# Patient Record
Sex: FEMALE | Race: OTHER | NOT HISPANIC OR LATINO | URBAN - METROPOLITAN AREA
[De-identification: names, ages, dates, MRNs, and addresses within clinical notes are randomized per-mention and may not be internally consistent; named-entity substitution may affect disease eponyms.]

---

## 2017-01-28 ENCOUNTER — INPATIENT (INPATIENT)
Facility: HOSPITAL | Age: 62
LOS: 2 days | Discharge: HOME | End: 2017-01-31
Attending: INTERNAL MEDICINE | Admitting: INTERNAL MEDICINE
Payer: MEDICAID

## 2017-01-28 VITALS
TEMPERATURE: 99 F | DIASTOLIC BLOOD PRESSURE: 80 MMHG | HEART RATE: 63 BPM | SYSTOLIC BLOOD PRESSURE: 129 MMHG | RESPIRATION RATE: 20 BRPM | OXYGEN SATURATION: 99 %

## 2017-01-28 DIAGNOSIS — E03.9 HYPOTHYROIDISM, UNSPECIFIED: ICD-10-CM

## 2017-01-28 DIAGNOSIS — R55 SYNCOPE AND COLLAPSE: ICD-10-CM

## 2017-01-28 DIAGNOSIS — I10 ESSENTIAL (PRIMARY) HYPERTENSION: ICD-10-CM

## 2017-01-28 LAB
ALBUMIN SERPL ELPH-MCNC: 3.9 G/DL — SIGNIFICANT CHANGE UP (ref 3.3–5)
ALP SERPL-CCNC: 71 U/L — SIGNIFICANT CHANGE UP (ref 40–120)
ALT FLD-CCNC: 33 U/L — SIGNIFICANT CHANGE UP (ref 4–33)
APTT BLD: 30.8 SEC — SIGNIFICANT CHANGE UP (ref 27.5–37.4)
AST SERPL-CCNC: 72 U/L — HIGH (ref 4–32)
BASE EXCESS BLDV CALC-SCNC: 3.5 MMOL/L — SIGNIFICANT CHANGE UP
BASOPHILS # BLD AUTO: 0.02 K/UL — SIGNIFICANT CHANGE UP (ref 0–0.2)
BASOPHILS NFR BLD AUTO: 0.5 % — SIGNIFICANT CHANGE UP (ref 0–2)
BILIRUB SERPL-MCNC: 0.5 MG/DL — SIGNIFICANT CHANGE UP (ref 0.2–1.2)
BLOOD GAS VENOUS - CREATININE: 1.73 MG/DL — HIGH (ref 0.5–1.3)
BUN SERPL-MCNC: 23 MG/DL — SIGNIFICANT CHANGE UP (ref 7–23)
CALCIUM SERPL-MCNC: 9.2 MG/DL — SIGNIFICANT CHANGE UP (ref 8.4–10.5)
CHLORIDE BLDV-SCNC: 105 MMOL/L — SIGNIFICANT CHANGE UP (ref 96–108)
CHLORIDE SERPL-SCNC: 100 MMOL/L — SIGNIFICANT CHANGE UP (ref 98–107)
CK MB BLD-MCNC: 1 NG/ML — SIGNIFICANT CHANGE UP (ref 1–4.7)
CK MB BLD-MCNC: 1.01 NG/ML — SIGNIFICANT CHANGE UP (ref 1–4.7)
CK MB BLD-MCNC: SIGNIFICANT CHANGE UP (ref 0–2.5)
CK SERPL-CCNC: 47 U/L — SIGNIFICANT CHANGE UP (ref 25–170)
CK SERPL-CCNC: 87 U/L — SIGNIFICANT CHANGE UP (ref 25–170)
CO2 SERPL-SCNC: 24 MMOL/L — SIGNIFICANT CHANGE UP (ref 22–31)
CREAT SERPL-MCNC: 1.59 MG/DL — HIGH (ref 0.5–1.3)
EOSINOPHIL # BLD AUTO: 0.03 K/UL — SIGNIFICANT CHANGE UP (ref 0–0.5)
EOSINOPHIL NFR BLD AUTO: 0.7 % — SIGNIFICANT CHANGE UP (ref 0–6)
GAS PNL BLDV: 140 MMOL/L — SIGNIFICANT CHANGE UP (ref 136–146)
GLUCOSE BLDV-MCNC: 117 — HIGH (ref 70–99)
GLUCOSE SERPL-MCNC: 120 MG/DL — HIGH (ref 70–99)
HCO3 BLDV-SCNC: 24 MMOL/L — SIGNIFICANT CHANGE UP (ref 20–27)
HCT VFR BLD CALC: 43 % — SIGNIFICANT CHANGE UP (ref 34.5–45)
HCT VFR BLDV CALC: 43.2 % — SIGNIFICANT CHANGE UP (ref 34.5–45)
HGB BLD-MCNC: 13.9 G/DL — SIGNIFICANT CHANGE UP (ref 11.5–15.5)
HGB BLDV-MCNC: 14.1 G/DL — SIGNIFICANT CHANGE UP (ref 11.5–15.5)
IMM GRANULOCYTES NFR BLD AUTO: 0.5 % — SIGNIFICANT CHANGE UP (ref 0–1.5)
INR BLD: 1.16 — SIGNIFICANT CHANGE UP (ref 0.87–1.18)
LACTATE BLDV-MCNC: 1.6 MMOL/L — SIGNIFICANT CHANGE UP (ref 0.5–2)
LYMPHOCYTES # BLD AUTO: 1.78 K/UL — SIGNIFICANT CHANGE UP (ref 1–3.3)
LYMPHOCYTES # BLD AUTO: 40.5 % — SIGNIFICANT CHANGE UP (ref 13–44)
MCHC RBC-ENTMCNC: 28.5 PG — SIGNIFICANT CHANGE UP (ref 27–34)
MCHC RBC-ENTMCNC: 32.3 % — SIGNIFICANT CHANGE UP (ref 32–36)
MCV RBC AUTO: 88.3 FL — SIGNIFICANT CHANGE UP (ref 80–100)
MONOCYTES # BLD AUTO: 0.37 K/UL — SIGNIFICANT CHANGE UP (ref 0–0.9)
MONOCYTES NFR BLD AUTO: 8.4 % — SIGNIFICANT CHANGE UP (ref 2–14)
NEUTROPHILS # BLD AUTO: 2.18 K/UL — SIGNIFICANT CHANGE UP (ref 1.8–7.4)
NEUTROPHILS NFR BLD AUTO: 49.4 % — SIGNIFICANT CHANGE UP (ref 43–77)
NT-PROBNP SERPL-SCNC: 121.8 PG/ML — SIGNIFICANT CHANGE UP
PCO2 BLDV: 59 MMHG — HIGH (ref 41–51)
PH BLDV: 7.32 PH — SIGNIFICANT CHANGE UP (ref 7.32–7.43)
PLATELET # BLD AUTO: 152 K/UL — SIGNIFICANT CHANGE UP (ref 150–400)
PMV BLD: 11 FL — SIGNIFICANT CHANGE UP (ref 7–13)
PO2 BLDV: < 24 MMHG — LOW (ref 35–40)
POTASSIUM BLDV-SCNC: 4.8 MMOL/L — HIGH (ref 3.4–4.5)
POTASSIUM SERPL-MCNC: 4.9 MMOL/L — SIGNIFICANT CHANGE UP (ref 3.5–5.3)
POTASSIUM SERPL-SCNC: 4.9 MMOL/L — SIGNIFICANT CHANGE UP (ref 3.5–5.3)
PROT SERPL-MCNC: 8.3 G/DL — SIGNIFICANT CHANGE UP (ref 6–8.3)
PROTHROM AB SERPL-ACNC: 13.2 SEC — HIGH (ref 10–13.1)
RBC # BLD: 4.87 M/UL — SIGNIFICANT CHANGE UP (ref 3.8–5.2)
RBC # FLD: 14.2 % — SIGNIFICANT CHANGE UP (ref 10.3–14.5)
SAO2 % BLDV: 11.9 % — LOW (ref 60–85)
SODIUM SERPL-SCNC: 141 MMOL/L — SIGNIFICANT CHANGE UP (ref 135–145)
TROPONIN T SERPL-MCNC: < 0.06 NG/ML — SIGNIFICANT CHANGE UP (ref 0–0.06)
TROPONIN T SERPL-MCNC: < 0.06 NG/ML — SIGNIFICANT CHANGE UP (ref 0–0.06)
TSH SERPL-MCNC: 0.81 UIU/ML — SIGNIFICANT CHANGE UP (ref 0.27–4.2)
WBC # BLD: 4.4 K/UL — SIGNIFICANT CHANGE UP (ref 3.8–10.5)
WBC # FLD AUTO: 4.4 K/UL — SIGNIFICANT CHANGE UP (ref 3.8–10.5)

## 2017-01-28 PROCEDURE — 71020: CPT | Mod: 26

## 2017-01-28 PROCEDURE — 70450 CT HEAD/BRAIN W/O DYE: CPT | Mod: 26

## 2017-01-28 NOTE — ED ADULT TRIAGE NOTE - CHIEF COMPLAINT QUOTE
From MD office with syncope pt AOX 3 stating being dizzy prior to syncope 2nd episode in Ambulance, denies cp/SOB   Hx. hypothyroid, htn. From MD office with syncope pt AOX 3 stating being dizzy prior to syncope 2nd episode in Ambulance, denies cp/SOB     [id number]    Hx. hypothyroid, htn.

## 2017-01-28 NOTE — H&P ADULT. - PROBLEM SELECTOR PLAN 1
Admit to tele  CBC, BMP, serial CE's, EKG, Echo, CT-A Admit to tele  orthostatics every 24 hours   CBC, BMP, serial CE's, EKG, Echo  CXR, CT head   f/u MD note Vasovagal vs orthostatic in setting of viral syndrome, poor PO intake and dehydration, less likely arrhythmia or cardiogenic  Monitor on telemetry  Check orthostatic VS  CBC, BMP, serial CE's, EKG  CXR, CT head done orthostasis in setting of viral syndrome, poor PO intake and dehydration, less likely arrhythmia or cardiogenic  Monitor on telemetry  Check orthostatic VS  CBC, BMP, serial CE's, EKG  CXR, CT head done

## 2017-01-28 NOTE — H&P ADULT. - NEGATIVE NEUROLOGICAL SYMPTOMS
no transient paralysis/no loss of sensation/no headache/no confusion/no vertigo/no hemiparesis/no paresthesias/no weakness

## 2017-01-28 NOTE — H&P ADULT. - NEGATIVE GASTROINTESTINAL SYMPTOMS
no constipation/no abdominal pain/no nausea/no vomiting/no melena/no hematochezia no constipation/no diarrhea/no abdominal pain/no nausea/no melena/no hematochezia/no change in bowel habits/no vomiting

## 2017-01-28 NOTE — ED ADULT NURSE NOTE - OBJECTIVE STATEMENT
pt to trauma A alert and oriented X 3 with family at bedside, pt comes after syncopal episode in MD's office today, pt has had  multiple episodes of syncope no cardiologist, no forma evaluation AMA from [place] on last admission p/w syncope in her PCP's office today sent by ambulance. Pt suffered from one episode yesterday, one episode today in doctor's office, apparently one episode witnessed by EMS.  No head strike or neck pain +LOC. Multiple episodes today.  No chest pain, shortness of breath, radiation to the arm, jaw, neck or back, worsening symptoms with exertion, headache, nausea, or vomiting.  No history of stress test, angiogram, echocardiogram, or follow up with a cardiologist.  No personal history of coronary artery disease, arrhythmia, or CHF.  No family history of coronary artery disease, sudden cardiac death, or arrhythmia., labs drawn and sent, vitals as noted, waiting for ct and CXR will repeat lab work when necessary

## 2017-01-28 NOTE — ED ADULT NURSE NOTE - CHIEF COMPLAINT QUOTE
From MD office with syncope pt AOX 3 stating being dizzy prior to syncope 2nd episode in Ambulance, denies cp/SOB     [id number]    Hx. hypothyroid, htn.

## 2017-01-28 NOTE — H&P ADULT. - RS GEN PE MLT RESP DETAILS PC
no wheezes/respirations non-labored/good air movement/airway patent/clear to auscultation bilaterally/breath sounds equal/no rhonchi/no rales

## 2017-01-28 NOTE — ED PROVIDER NOTE - ATTENDING CONTRIBUTION TO CARE
MD Mckeon:  patient seen and evaluated with the resident.  I was present for key portions of the History & Physical, and I agree with the Impression & Plan.  MD Mckeon:  62 yo F, c/o 3 episodes syncope in the last 24 hrs.  Once yesterday while making dinner, once in MD office this AM, and then again in ambulance on way to the ED.  No CP/SOB, no head injury/neck pain/weakness.  Patient feels fine now.  No new meds or dose-changes, no dysuria/F/C/back pain.  Vs - wnl.  ECG - unremarkable.  Physical Exam: adult F, NAD, NCAT, PERRL, EOMI, Neck supple, CTA B, RRR, no rmg, Abd s/nd/nd.  Strength 5/5 bilaterally throughout.  Impression:  Recurrent episodes of syncope and collapse that have been insufficiently worked up in the past.  Plan:  Tele, enzymes, UA,

## 2017-01-28 NOTE — ED PROVIDER NOTE - OBJECTIVE STATEMENT
61 YOF PMH HTN, hypothyroidism, multiple episodes of syncope no cardiologist, no forma evaluation AMA from [place] on last admission p/w syncope in her PCP's office today sent by ambulance. Pt suffered from one episode yesterday, one episode today in doctor's office.  No head strike or neck pain +LOC. Multiple episodes today.  No chest pain, shortness of breath, radiation to the arm, jaw, neck or back, worsening symptoms with exertion, headache, nausea, or vomiting.  No history of stress test, angiogram, echocardiogram, or follow up with a cardiologist.  No personal history of coronary artery disease, arrhythmia, or CHF.  No family history of coronary artery disease, sudden cardiac death, or arrhythmia. 61 YOF PMH HTN, hypothyroidism, multiple episodes of syncope no cardiologist, no forma evaluation AMA from [place] on last admission p/w syncope in her PCP's office today sent by ambulance. Pt suffered from one episode yesterday, one episode today in doctor's office, apparently one episode witnessed by EMS.  No head strike or neck pain +LOC. Multiple episodes today.  No chest pain, shortness of breath, radiation to the arm, jaw, neck or back, worsening symptoms with exertion, headache, nausea, or vomiting.  No history of stress test, angiogram, echocardiogram, or follow up with a cardiologist.  No personal history of coronary artery disease, arrhythmia, or CHF.  No family history of coronary artery disease, sudden cardiac death, or arrhythmia.

## 2017-01-28 NOTE — H&P ADULT. - PSYCHIATRIC
negative Affect and characteristics of appearance, verbalizations, behaviors are appropriate detailed exam

## 2017-01-28 NOTE — ED PROVIDER NOTE - MEDICAL DECISION MAKING DETAILS
61 YOF PMH HTN, hypothyroidism, multiple episodes of syncope no cardiologist, no forma evaluation AMA from [place] on last admission p/w syncope in her PCP's office today sent by ambulance.  R/O ACS, evaluate for arrhythmia, admit for echo/stress/syncope evaluation.

## 2017-01-28 NOTE — H&P ADULT. - PROBLEM SELECTOR PLAN 3
Order TSH, T3, T4  Continue Levothyroxine 100mg Monitor blood pressure  Continue Losartan 100mg  DASH diet Monitor blood pressure  Hold losartan pending orthostatic assessment  DASH diet

## 2017-01-28 NOTE — H&P ADULT. - NEGATIVE ALLERGY TYPES
no indoor environmental allergies/no outdoor environmental allergies/no reactions to food/no reactions to medicines

## 2017-01-28 NOTE — H&P ADULT. - HISTORY OF PRESENT ILLNESS
62 yo female with PMHx of HTN, hypothyroidism presents with syncope. She describes feeling lightheaded and diaphoretic before syncopizing. She has had these episodes 7-8 times in the past two weeks. Her  states that she usually loses consciousness for 15-20 seconds and returns to her normal self immediately. Today, she had an episode of diarrhea with her syncope. Of note, she has been sick for the past 2 weeks with fevers, chills, malaise. She denies nausea, vomiting, constipation, abdominal pain, chest pain, palpitations, SOB, cough, numbness, tingling, headache, weakness, hemiparesis, incontinence, dysuria, hematuria. 60 yo female with PMHx of HTN, hypothyroidism presents with syncope. Pt states she was at the doctors office and she felt lightheaded and diaphoretic and syncopized. Pt reports she has had these episodes 7-8 times in the past two weeks. As per , pt usually loses consciousness for 15-20 seconds and returns to her normal self immediately. Pt reports she has been sick for the past 2 weeks with fevers, chills, malaise. Pt states she was given an antibiotic which she can't recall,  however she is still feeling these symptoms.  Pt denies nausea, vomiting, constipation, abdominal pain, chest pain, palpitations, SOB, cough, numbness, tingling, headache, weakness, hemiparesis, incontinence, dysuria, hematuria or any other complaints at this time. 60 yo female with PMHx of HTN, hypothyroidism presents with syncope. Pt states she was at the doctors office and she felt lightheaded and diaphoretic and syncopized. Pt also syncopized once when EMS arrived. Pt reports she has had these episodes 7-8 times in the past two weeks. As per , pt usually loses consciousness for 15-20 seconds and returns to her normal self immediately. Pt reports she has been sick for the past 2 weeks with fevers, chills, malaise. Pt states she was given an antibiotic which she can't recall,  however she is still feeling these symptoms.  Pt denies nausea, vomiting, constipation, abdominal pain, chest pain, palpitations, SOB, cough, numbness, tingling, headache, weakness, hemiparesis, incontinence, dysuria, hematuria or any other complaints at this time. 62 yo female with PMHx of HTN, hypothyroidism presents with syncope. Pt states she was at the doctors office and she felt lightheaded and diaphoretic and syncopized. Pt also syncopized once when EMS arrived. Pt reports she has had these episodes 7-8 times in the past two weeks. As per , pt usually loses consciousness for 15-20 seconds and returns to her normal self immediately. Usually occurs when she is getting up from seated position or standing for long periods of time. Pt reports she has been sick for the past 2 weeks with fevers, chills, malaise. Daughter has the flu. Pt states she was given an antibiotic which she can't recall,  however she is still feeling these symptoms.  Pt denies nausea, vomiting, constipation, abdominal pain, chest pain, palpitations, SOB, cough, numbness, tingling, headache, weakness, hemiparesis, incontinence, dysuria, hematuria or any other complaints at this time.

## 2017-01-28 NOTE — H&P ADULT. - PROBLEM SELECTOR PLAN 2
Monitor vitals  Continue Losartan 100mg Monitor renal function. Avoid nephrotoxic drugs.   Normal saline @ 75 cc an hour. Likely prerenal  Monitor renal function. Avoid nephrotoxic drugs.   Normal saline @ 75 cc an hour

## 2017-01-28 NOTE — H&P ADULT. - ASSESSMENT
60 yo F with PMHx of HTN, hypothyroidism presents with syncope + LOC 60 yo F with PMHx of HTN, hypothyroidism presents with multiple episodes of syncope. Admitted to telemetry for syncope.

## 2017-01-29 DIAGNOSIS — R50.9 FEVER, UNSPECIFIED: ICD-10-CM

## 2017-01-29 DIAGNOSIS — N17.9 ACUTE KIDNEY FAILURE, UNSPECIFIED: ICD-10-CM

## 2017-01-29 DIAGNOSIS — Z41.8 ENCOUNTER FOR OTHER PROCEDURES FOR PURPOSES OTHER THAN REMEDYING HEALTH STATE: ICD-10-CM

## 2017-01-29 LAB
APPEARANCE UR: CLEAR — SIGNIFICANT CHANGE UP
B PERT DNA SPEC QL NAA+PROBE: SIGNIFICANT CHANGE UP
BACTERIA # UR AUTO: SIGNIFICANT CHANGE UP
BILIRUB UR-MCNC: NEGATIVE — SIGNIFICANT CHANGE UP
BLOOD UR QL VISUAL: NEGATIVE — SIGNIFICANT CHANGE UP
BUN SERPL-MCNC: 26 MG/DL — HIGH (ref 7–23)
C PNEUM DNA SPEC QL NAA+PROBE: NOT DETECTED — SIGNIFICANT CHANGE UP
CALCIUM SERPL-MCNC: 7.9 MG/DL — LOW (ref 8.4–10.5)
CHLORIDE SERPL-SCNC: 102 MMOL/L — SIGNIFICANT CHANGE UP (ref 98–107)
CHOLEST SERPL-MCNC: 152 MG/DL — SIGNIFICANT CHANGE UP (ref 120–199)
CO2 SERPL-SCNC: 22 MMOL/L — SIGNIFICANT CHANGE UP (ref 22–31)
COLOR SPEC: SIGNIFICANT CHANGE UP
CREAT SERPL-MCNC: 2.52 MG/DL — HIGH (ref 0.5–1.3)
FLUAV H1 2009 PAND RNA SPEC QL NAA+PROBE: POSITIVE — HIGH
FLUAV H1 RNA SPEC QL NAA+PROBE: NOT DETECTED — SIGNIFICANT CHANGE UP
FLUAV H3 RNA SPEC QL NAA+PROBE: NOT DETECTED — SIGNIFICANT CHANGE UP
FLUBV RNA SPEC QL NAA+PROBE: NOT DETECTED — SIGNIFICANT CHANGE UP
GLUCOSE SERPL-MCNC: 100 MG/DL — HIGH (ref 70–99)
GLUCOSE UR-MCNC: NEGATIVE — SIGNIFICANT CHANGE UP
HADV DNA SPEC QL NAA+PROBE: NOT DETECTED — SIGNIFICANT CHANGE UP
HBA1C BLD-MCNC: 6.1 % — HIGH (ref 4–5.6)
HCOV 229E RNA SPEC QL NAA+PROBE: NOT DETECTED — SIGNIFICANT CHANGE UP
HCOV HKU1 RNA SPEC QL NAA+PROBE: NOT DETECTED — SIGNIFICANT CHANGE UP
HCOV NL63 RNA SPEC QL NAA+PROBE: NOT DETECTED — SIGNIFICANT CHANGE UP
HCOV OC43 RNA SPEC QL NAA+PROBE: NOT DETECTED — SIGNIFICANT CHANGE UP
HCT VFR BLD CALC: 38.2 % — SIGNIFICANT CHANGE UP (ref 34.5–45)
HDLC SERPL-MCNC: 29 MG/DL — LOW (ref 45–65)
HGB BLD-MCNC: 12.6 G/DL — SIGNIFICANT CHANGE UP (ref 11.5–15.5)
HMPV RNA SPEC QL NAA+PROBE: NOT DETECTED — SIGNIFICANT CHANGE UP
HPIV1 RNA SPEC QL NAA+PROBE: NOT DETECTED — SIGNIFICANT CHANGE UP
HPIV2 RNA SPEC QL NAA+PROBE: NOT DETECTED — SIGNIFICANT CHANGE UP
HPIV3 RNA SPEC QL NAA+PROBE: NOT DETECTED — SIGNIFICANT CHANGE UP
HPIV4 RNA SPEC QL NAA+PROBE: NOT DETECTED — SIGNIFICANT CHANGE UP
KETONES UR-MCNC: NEGATIVE — SIGNIFICANT CHANGE UP
LEUKOCYTE ESTERASE UR-ACNC: HIGH
LIPID PNL WITH DIRECT LDL SERPL: 114 MG/DL — SIGNIFICANT CHANGE UP
M PNEUMO DNA SPEC QL NAA+PROBE: NOT DETECTED — SIGNIFICANT CHANGE UP
MAGNESIUM SERPL-MCNC: 2 MG/DL — SIGNIFICANT CHANGE UP (ref 1.6–2.6)
MCHC RBC-ENTMCNC: 28.8 PG — SIGNIFICANT CHANGE UP (ref 27–34)
MCHC RBC-ENTMCNC: 33 % — SIGNIFICANT CHANGE UP (ref 32–36)
MCV RBC AUTO: 87.4 FL — SIGNIFICANT CHANGE UP (ref 80–100)
MUCOUS THREADS # UR AUTO: SIGNIFICANT CHANGE UP
NITRITE UR-MCNC: NEGATIVE — SIGNIFICANT CHANGE UP
PH UR: 6 — SIGNIFICANT CHANGE UP (ref 4.6–8)
PHOSPHATE SERPL-MCNC: 4.5 MG/DL — SIGNIFICANT CHANGE UP (ref 2.5–4.5)
PLATELET # BLD AUTO: 140 K/UL — LOW (ref 150–400)
PMV BLD: 10.8 FL — SIGNIFICANT CHANGE UP (ref 7–13)
POTASSIUM SERPL-MCNC: 4 MMOL/L — SIGNIFICANT CHANGE UP (ref 3.5–5.3)
POTASSIUM SERPL-SCNC: 4 MMOL/L — SIGNIFICANT CHANGE UP (ref 3.5–5.3)
PROT UR-MCNC: NEGATIVE — SIGNIFICANT CHANGE UP
RBC # BLD: 4.37 M/UL — SIGNIFICANT CHANGE UP (ref 3.8–5.2)
RBC # FLD: 14.3 % — SIGNIFICANT CHANGE UP (ref 10.3–14.5)
RBC CASTS # UR COMP ASSIST: SIGNIFICANT CHANGE UP (ref 0–?)
RSV RNA SPEC QL NAA+PROBE: NOT DETECTED — SIGNIFICANT CHANGE UP
RV+EV RNA SPEC QL NAA+PROBE: NOT DETECTED — SIGNIFICANT CHANGE UP
SODIUM SERPL-SCNC: 141 MMOL/L — SIGNIFICANT CHANGE UP (ref 135–145)
SP GR SPEC: 1.01 — SIGNIFICANT CHANGE UP (ref 1–1.03)
SQUAMOUS # UR AUTO: SIGNIFICANT CHANGE UP
TRIGL SERPL-MCNC: 82 MG/DL — SIGNIFICANT CHANGE UP (ref 10–149)
UROBILINOGEN FLD QL: NORMAL E.U. — SIGNIFICANT CHANGE UP (ref 0.1–0.2)
WBC # BLD: 4.84 K/UL — SIGNIFICANT CHANGE UP (ref 3.8–10.5)
WBC # FLD AUTO: 4.84 K/UL — SIGNIFICANT CHANGE UP (ref 3.8–10.5)
WBC UR QL: SIGNIFICANT CHANGE UP (ref 0–?)

## 2017-01-29 PROCEDURE — 99223 1ST HOSP IP/OBS HIGH 75: CPT | Mod: GC

## 2017-01-29 RX ORDER — LOSARTAN POTASSIUM 100 MG/1
1 TABLET, FILM COATED ORAL
Qty: 0 | Refills: 0 | COMMUNITY

## 2017-01-29 RX ORDER — SODIUM CHLORIDE 9 MG/ML
1000 INJECTION INTRAMUSCULAR; INTRAVENOUS; SUBCUTANEOUS
Qty: 0 | Refills: 0 | Status: DISCONTINUED | OUTPATIENT
Start: 2017-01-29 | End: 2017-01-31

## 2017-01-29 RX ORDER — LOSARTAN POTASSIUM 100 MG/1
100 TABLET, FILM COATED ORAL DAILY
Qty: 0 | Refills: 0 | Status: DISCONTINUED | OUTPATIENT
Start: 2017-01-29 | End: 2017-01-29

## 2017-01-29 RX ORDER — LEVOTHYROXINE SODIUM 125 MCG
1 TABLET ORAL
Qty: 0 | Refills: 0 | COMMUNITY

## 2017-01-29 RX ORDER — LEVOTHYROXINE SODIUM 125 MCG
100 TABLET ORAL DAILY
Qty: 0 | Refills: 0 | Status: DISCONTINUED | OUTPATIENT
Start: 2017-01-29 | End: 2017-01-31

## 2017-01-29 RX ADMIN — SODIUM CHLORIDE 75 MILLILITER(S): 9 INJECTION INTRAMUSCULAR; INTRAVENOUS; SUBCUTANEOUS at 22:45

## 2017-01-29 RX ADMIN — SODIUM CHLORIDE 75 MILLILITER(S): 9 INJECTION INTRAMUSCULAR; INTRAVENOUS; SUBCUTANEOUS at 13:04

## 2017-01-29 RX ADMIN — SODIUM CHLORIDE 75 MILLILITER(S): 9 INJECTION INTRAMUSCULAR; INTRAVENOUS; SUBCUTANEOUS at 07:15

## 2017-01-29 RX ADMIN — Medication 30 MILLIGRAM(S): at 12:59

## 2017-01-29 RX ADMIN — Medication 100 MICROGRAM(S): at 06:00

## 2017-01-29 RX ADMIN — Medication 30 MILLIGRAM(S): at 23:10

## 2017-01-30 LAB
BUN SERPL-MCNC: 20 MG/DL — SIGNIFICANT CHANGE UP (ref 7–23)
CALCIUM SERPL-MCNC: 8.5 MG/DL — SIGNIFICANT CHANGE UP (ref 8.4–10.5)
CHLORIDE SERPL-SCNC: 106 MMOL/L — SIGNIFICANT CHANGE UP (ref 98–107)
CO2 SERPL-SCNC: 24 MMOL/L — SIGNIFICANT CHANGE UP (ref 22–31)
CREAT SERPL-MCNC: 1.55 MG/DL — HIGH (ref 0.5–1.3)
GLUCOSE SERPL-MCNC: 91 MG/DL — SIGNIFICANT CHANGE UP (ref 70–99)
HCT VFR BLD CALC: 41.5 % — SIGNIFICANT CHANGE UP (ref 34.5–45)
HGB BLD-MCNC: 13.4 G/DL — SIGNIFICANT CHANGE UP (ref 11.5–15.5)
MCHC RBC-ENTMCNC: 28.3 PG — SIGNIFICANT CHANGE UP (ref 27–34)
MCHC RBC-ENTMCNC: 32.3 % — SIGNIFICANT CHANGE UP (ref 32–36)
MCV RBC AUTO: 87.7 FL — SIGNIFICANT CHANGE UP (ref 80–100)
PLATELET # BLD AUTO: 159 K/UL — SIGNIFICANT CHANGE UP (ref 150–400)
PMV BLD: 10.9 FL — SIGNIFICANT CHANGE UP (ref 7–13)
POTASSIUM SERPL-MCNC: 3.8 MMOL/L — SIGNIFICANT CHANGE UP (ref 3.5–5.3)
POTASSIUM SERPL-SCNC: 3.8 MMOL/L — SIGNIFICANT CHANGE UP (ref 3.5–5.3)
RBC # BLD: 4.73 M/UL — SIGNIFICANT CHANGE UP (ref 3.8–5.2)
RBC # FLD: 14.2 % — SIGNIFICANT CHANGE UP (ref 10.3–14.5)
SODIUM SERPL-SCNC: 142 MMOL/L — SIGNIFICANT CHANGE UP (ref 135–145)
WBC # BLD: 3.24 K/UL — LOW (ref 3.8–10.5)
WBC # FLD AUTO: 3.24 K/UL — LOW (ref 3.8–10.5)

## 2017-01-30 PROCEDURE — 99233 SBSQ HOSP IP/OBS HIGH 50: CPT

## 2017-01-30 PROCEDURE — 93880 EXTRACRANIAL BILAT STUDY: CPT | Mod: 26

## 2017-01-30 RX ADMIN — Medication 30 MILLIGRAM(S): at 06:17

## 2017-01-30 RX ADMIN — Medication 30 MILLIGRAM(S): at 17:46

## 2017-01-30 RX ADMIN — Medication 100 MICROGRAM(S): at 06:17

## 2017-01-31 VITALS
HEART RATE: 71 BPM | RESPIRATION RATE: 16 BRPM | DIASTOLIC BLOOD PRESSURE: 89 MMHG | TEMPERATURE: 99 F | SYSTOLIC BLOOD PRESSURE: 154 MMHG | OXYGEN SATURATION: 99 %

## 2017-01-31 LAB
BUN SERPL-MCNC: 15 MG/DL — SIGNIFICANT CHANGE UP (ref 7–23)
CALCIUM SERPL-MCNC: 8.5 MG/DL — SIGNIFICANT CHANGE UP (ref 8.4–10.5)
CHLORIDE SERPL-SCNC: 104 MMOL/L — SIGNIFICANT CHANGE UP (ref 98–107)
CO2 SERPL-SCNC: 22 MMOL/L — SIGNIFICANT CHANGE UP (ref 22–31)
CREAT SERPL-MCNC: 1.04 MG/DL — SIGNIFICANT CHANGE UP (ref 0.5–1.3)
GLUCOSE SERPL-MCNC: 84 MG/DL — SIGNIFICANT CHANGE UP (ref 70–99)
HCT VFR BLD CALC: 41.5 % — SIGNIFICANT CHANGE UP (ref 34.5–45)
HGB BLD-MCNC: 13.3 G/DL — SIGNIFICANT CHANGE UP (ref 11.5–15.5)
MAGNESIUM SERPL-MCNC: 1.8 MG/DL — SIGNIFICANT CHANGE UP (ref 1.6–2.6)
MCHC RBC-ENTMCNC: 28 PG — SIGNIFICANT CHANGE UP (ref 27–34)
MCHC RBC-ENTMCNC: 32 % — SIGNIFICANT CHANGE UP (ref 32–36)
MCV RBC AUTO: 87.4 FL — SIGNIFICANT CHANGE UP (ref 80–100)
PLATELET # BLD AUTO: 156 K/UL — SIGNIFICANT CHANGE UP (ref 150–400)
PMV BLD: 10.9 FL — SIGNIFICANT CHANGE UP (ref 7–13)
POTASSIUM SERPL-MCNC: 3.8 MMOL/L — SIGNIFICANT CHANGE UP (ref 3.5–5.3)
POTASSIUM SERPL-SCNC: 3.8 MMOL/L — SIGNIFICANT CHANGE UP (ref 3.5–5.3)
RBC # BLD: 4.75 M/UL — SIGNIFICANT CHANGE UP (ref 3.8–5.2)
RBC # FLD: 14.4 % — SIGNIFICANT CHANGE UP (ref 10.3–14.5)
SODIUM SERPL-SCNC: 143 MMOL/L — SIGNIFICANT CHANGE UP (ref 135–145)
WBC # BLD: 3.55 K/UL — LOW (ref 3.8–10.5)
WBC # FLD AUTO: 3.55 K/UL — LOW (ref 3.8–10.5)

## 2017-01-31 PROCEDURE — 99239 HOSP IP/OBS DSCHRG MGMT >30: CPT

## 2017-01-31 PROCEDURE — 93306 TTE W/DOPPLER COMPLETE: CPT | Mod: 26

## 2017-01-31 PROCEDURE — 76770 US EXAM ABDO BACK WALL COMP: CPT | Mod: 26

## 2017-01-31 RX ADMIN — Medication 100 MICROGRAM(S): at 05:50

## 2017-01-31 RX ADMIN — Medication 30 MILLIGRAM(S): at 19:02

## 2017-01-31 RX ADMIN — Medication 30 MILLIGRAM(S): at 05:49

## 2017-01-31 NOTE — DISCHARGE NOTE ADULT - PLAN OF CARE
prevent reoccurrence likely secondary to dehydration, maintain yourself well hydrated, drink plenty of fluids complete course of Tamiflu 75mg 2 x a day for two more days low salt diet, continue losartan as prescribed continue synthroid

## 2017-01-31 NOTE — DISCHARGE NOTE ADULT - CARE PLAN
Principal Discharge DX:	Syncope  Goal:	prevent reoccurrence  Instructions for follow-up, activity and diet:	likely secondary to dehydration, maintain yourself well hydrated, drink plenty of fluids  Secondary Diagnosis:	Flu  Instructions for follow-up, activity and diet:	complete course of Tamiflu 75mg 2 x a day for two more days  Secondary Diagnosis:	Hypertension  Instructions for follow-up, activity and diet:	low salt diet, continue losartan as prescribed  Secondary Diagnosis:	Hypothyroid  Instructions for follow-up, activity and diet:	continue synthroid

## 2017-01-31 NOTE — DISCHARGE NOTE ADULT - MEDICATION SUMMARY - MEDICATIONS TO TAKE
I will START or STAY ON the medications listed below when I get home from the hospital:    losartan 100 mg oral tablet  -- 1 tab(s) by mouth once a day  -- Indication: For Hypertension    Tamiflu 75 mg oral capsule  -- 1 tab(s) by mouth once a day  -- Indication: For FLu     levothyroxine 100 mcg (0.1 mg) oral tablet  -- 1 tab(s) by mouth once a day  -- Indication: For Hypothyroid

## 2017-01-31 NOTE — PHYSICAL THERAPY INITIAL EVALUATION ADULT - GAIT DISTANCE, PT EVAL
Pt able to march in place, independently, without AD, 25 steps; pt with +IV connected to to bed , unable to progress forward

## 2017-01-31 NOTE — DISCHARGE NOTE ADULT - HOSPITAL COURSE
60 yo female with PMHx of HTN, hypothyroidism presents with syncope. Pt states she was at the doctors office and she felt lightheaded and diaphoretic and syncopized. Pt also syncopized once when EMS arrived. Pt reports she has had these episodes 7-8 times in the past two weeks. As per , pt usually loses consciousness for 15-20 seconds and returns to her normal self immediately. Usually occurs when she is getting up from seated position or standing for long periods of time. Pt reports she has been sick for the past 2 weeks with fevers, chills, malaise. Daughter has the flu. Pt states she was given an antibiotic which she can't recall,  however she is still feeling these symptoms.  Pt denies nausea, vomiting, constipation, abdominal pain, chest pain, palpitations, SOB, cough, numbness, tingling, headache, weakness, hemiparesis, incontinence, dysuria, hematuria or any other complaints at this time.  CEX 2: negative   SCr 1.59--> 1.04 s/p IVF   EKG: NSR @ 68 bpm, TWI in I, AVL  CT head No acute intracranial hemorrhage, extra axial fluid collection or   displaced skull fracture. Chronic microvascular ischemic changes with   age-indeterminate infarcts as described. If clinically indicated, a   short-term follow-up or an MRI may be obtained for further evaluation.    CXR:No acute pulmonary process.  1/30 Med: C/w tamiflu and IVF, syncope likely due to decrease PO intake, DC planning  1/30 Carotids- no hemodynamically significant stenosis  Renal US normal  echo:1. Endocardium not well visualized; grossly normal left  ventricular systolic function.  2. Normal right ventricular size and function.  3. Estimated pulmonary artery systolic pressure equals 35  mm Hg, assuming right atrial pressure equals 10  mm Hg,  consistent with borderline pulmonary hypertension.  1/31 Patient currently stable for discharge home today, to complete 2 more days of tamifu. 60 yo female with PMHx of HTN, hypothyroidism presents with syncope. Pt states she was at the doctors office and she felt lightheaded and diaphoretic and syncopized. Pt also syncopized once when EMS arrived. Pt reports she has had these episodes 7-8 times in the past two weeks. As per , pt usually loses consciousness for 15-20 seconds and returns to her normal self immediately. Usually occurs when she is getting up from seated position or standing for long periods of time. Pt reports she has been sick for the past 2 weeks with fevers, chills, malaise. Daughter has the flu. Pt states she was given an antibiotic which she can't recall,  however she is still feeling these symptoms.  Pt denies nausea, vomiting, constipation, abdominal pain, chest pain, palpitations, SOB, cough, numbness, tingling, headache, weakness, hemiparesis, incontinence, dysuria, hematuria or any other complaints at this time.  CEX 2: negative   SCr 1.59--> 1.04 s/p IVF   EKG: NSR @ 68 bpm, TWI in I, AVL  CT head No acute intracranial hemorrhage, extra axial fluid collection or   displaced skull fracture. Chronic microvascular ischemic changes with   age-indeterminate infarcts as described. If clinically indicated, a   short-term follow-up or an MRI may be obtained for further evaluation.    CXR:No acute pulmonary process.  1/30 Med: C/w tamiflu and IVF, syncope likely due to decrease PO intake, DC planning  1/30 Carotids- no hemodynamically significant stenosis  Renal US normal  echo:1. Endocardium not well visualized; grossly normal left  ventricular systolic function.  2. Normal right ventricular size and function.  3. Estimated pulmonary artery systolic pressure equals 35  mm Hg, assuming right atrial pressure equals 10  mm Hg,  consistent with borderline pulmonary hypertension.  1/31 Patient currently stable for discharge home today, to complete 2 more days of tamiflu.

## 2017-01-31 NOTE — PHYSICAL THERAPY INITIAL EVALUATION ADULT - PERTINENT HX OF CURRENT PROBLEM, REHAB EVAL
62 yo female with PMHx of HTN, hypothyroidism presents with syncope. Pt states she was at the doctors office and she felt lightheaded and diaphoretic and syncopized. Pt also syncopized once when EMS arrived. Pt reports she has had these episodes 7-8 times in the past two weeks.

## 2017-01-31 NOTE — DISCHARGE NOTE ADULT - NS AS ACTIVITY OBS
activity as tolerated/Walking-Outdoors allowed/Showering allowed/Stairs allowed/Walking-Indoors allowed

## 2017-01-31 NOTE — DISCHARGE NOTE ADULT - PATIENT PORTAL LINK FT
“You can access the [employer] Patient Portal, offered by [facility], by registering with the following website: [email or web address]”

## 2017-01-31 NOTE — DISCHARGE NOTE ADULT - CARE PROVIDER_API CALL
Roscoe Crawford), Internal Medicine  [address], [place] [id number]  Phone: [phone]  Fax: [phone]
